# Patient Record
Sex: MALE
[De-identification: names, ages, dates, MRNs, and addresses within clinical notes are randomized per-mention and may not be internally consistent; named-entity substitution may affect disease eponyms.]

---

## 2018-06-07 ENCOUNTER — HOSPITAL ENCOUNTER (INPATIENT)
Dept: HOSPITAL 42 - ED | Age: 38
LOS: 3 days | Discharge: HOME HEALTH | DRG: 501 | End: 2018-06-10
Attending: INTERNAL MEDICINE | Admitting: INTERNAL MEDICINE
Payer: COMMERCIAL

## 2018-06-07 VITALS — BODY MASS INDEX: 42.7 KG/M2
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
## 2018-06-07 DIAGNOSIS — Z87.891: ICD-10-CM

## 2018-06-07 DIAGNOSIS — S76.111A: Primary | ICD-10-CM

## 2018-06-07 DIAGNOSIS — M19.90: ICD-10-CM

## 2018-06-07 DIAGNOSIS — M25.761: ICD-10-CM

## 2018-06-07 DIAGNOSIS — E66.9: ICD-10-CM

## 2018-06-07 DIAGNOSIS — I10: ICD-10-CM

## 2018-06-07 LAB
ALBUMIN SERPL-MCNC: 4.4 G/DL (ref 3–4.8)
ALBUMIN/GLOB SERPL: 1.4 {RATIO} (ref 1.1–1.8)
ALT SERPL-CCNC: 50 U/L (ref 7–56)
AST SERPL-CCNC: 36 U/L (ref 17–59)
BASOPHILS # BLD AUTO: 0.01 K/MM3 (ref 0–2)
BASOPHILS NFR BLD: 0.2 % (ref 0–3)
BUN SERPL-MCNC: 13 MG/DL (ref 7–21)
CALCIUM SERPL-MCNC: 8.8 MG/DL (ref 8.4–10.5)
EOSINOPHIL # BLD: 0.1 10*3/UL (ref 0–0.7)
EOSINOPHIL NFR BLD: 1.6 % (ref 1.5–5)
ERYTHROCYTE [DISTWIDTH] IN BLOOD BY AUTOMATED COUNT: 12.7 % (ref 11.5–14.5)
GFR NON-AFRICAN AMERICAN: > 60
GRANULOCYTES # BLD: 3.89 10*3/UL (ref 1.4–6.5)
GRANULOCYTES NFR BLD: 60.5 % (ref 50–68)
HGB BLD-MCNC: 14.3 G/DL (ref 14–18)
INR PPP: 1.14 (ref 0.93–1.08)
LYMPHOCYTES # BLD: 1.7 10*3/UL (ref 1.2–3.4)
LYMPHOCYTES NFR BLD AUTO: 26.7 % (ref 22–35)
MCH RBC QN AUTO: 31.4 PG (ref 25–35)
MCHC RBC AUTO-ENTMCNC: 34.6 G/DL (ref 31–37)
MCV RBC AUTO: 90.6 FL (ref 80–105)
MONOCYTES # BLD AUTO: 0.7 10*3/UL (ref 0.1–0.6)
MONOCYTES NFR BLD: 11 % (ref 1–6)
PLATELET # BLD: 192 10^3/UL (ref 120–450)
PMV BLD AUTO: 9.8 FL (ref 7–11)
PROTHROMBIN TIME: 13 SECONDS (ref 9.4–12.5)
RBC # BLD AUTO: 4.56 10^6/UL (ref 3.5–6.1)
WBC # BLD AUTO: 6.4 10^3/UL (ref 4.5–11)

## 2018-06-07 PROCEDURE — 0LQQ0ZZ REPAIR RIGHT KNEE TENDON, OPEN APPROACH: ICD-10-PCS | Performed by: ORTHOPAEDIC SURGERY

## 2018-06-07 NOTE — RAD
HISTORY:

PREOP  



COMPARISON:

No prior. 



FINDINGS:



LUNGS:

No active pulmonary disease.



PLEURA:

No significant pleural effusion identified, no pneumothorax apparent.



CARDIOVASCULAR:

Normal.



OSSEOUS STRUCTURES:

No significant abnormalities.



VISUALIZED UPPER ABDOMEN:

Normal.



OTHER FINDINGS:

None.



IMPRESSION:

No active disease.

## 2018-06-07 NOTE — HP
DATE OF EXAM:  06/07/2018



HISTORY OF PRESENT ILLNESS:  This 37-year-old male was examined in the

[facility] ER in the presence of his wife.  He presented with a

chief complaint of right knee discomfort.  He was on his way to work,

tripped on a curb, fell on his right knee but denied any head trauma or loss

of consciousness.  He complains of right knee pain that was moderately

severe in nature and the patient was unable to ambulate further.  He came

to the [facility] ER for further evaluation of the above.  In

the emergency room, he was evaluated with knee x-ray, knee CT, and knee MRI

and found to have a complete tear of the patellar tendon with a 17 mm

separation from his inferior border of the patella.  A large amount of

edema and fluid anterior to the patella was noted at the ruptured site.  No

knee fractures were noted and the patient is being evaluated by Orthopedics

for further evaluation of the above.  On further questioning of the

patient, he has a history of chronic hypertension and takes Zestril 2.5 mg

p.o. daily for this condition.  His primary care physician is Dr. David Bedolla.  On further questioning of the patient, he has had a right ankle

fracture in his past that was treated by Dr. Phuc Garza from

Orthopedics.



REVIEW OF SYSTEMS:

HEAD REVIEW:  No headache or seizure.

EYES REVIEW:  No change in visual acuity.

EARS REVIEW:  No hearing loss.

THROAT REVIEW:  No swallowing difficulty.

NECK REVIEW:  No stiffness.

CARDIAC REVIEW:  Chronic hypertension, on Zestril.  Denies any chest pain

or dyspnea on exertion.

PULMONARY:  No cough.  No hemoptysis.

GI:  No hematemesis or melena.

:  No dysuria.

SKIN:  No rash.

VASCULAR:  No claudication.

PSYCHOLOGIC:  No history of anxiety or depression.

NEUROLOGIC:  No seizure.  No stroke.



ALLERGIES:  HE DENIES ANY ALLERGIES TO MEDICATION.



SOCIAL HISTORY:  He states he is a former smoker.  No history of IV drug

misuse.



PAST MEDICAL HISTORY:  History of right ankle fracture in the distant past.



FAMILY HISTORY:  Noncontributory.



PHYSICAL EXAMINATION:

VITAL SIGNS:  Temperature 98.1, respirations 18, pulse 67, blood pressure

144/82 with a pulse ox of 98% on room air.

HEENT:  Head:  Normocephalic, atraumatic.  Eyes:  No icterus.  Throat: 

Clear.

NECK:  Supple.

HEART:  Regular, S1 and S2.  No pathological rubs, murmurs, or gallops.

LUNGS:  Clear to auscultation.

ABDOMEN:  Obese, nontender.  No palpable organomegaly.  No rebound, no

guarding, no tenderness.

EXTREMITIES:  No clubbing, no cyanosis, no edema.  He has an Ace bandage

wrapped around his right knee.  Legs warm to touch.

SKIN:  Without rash.

NEUROLOGIC:  Intact.

PSYCHOLOGIC:  Alert.

VASCULAR:  Legs warm to touch.



LABORATORY DATA:  Sodium 142, K 4.3, chloride 103, bicarb 27.  BUN 13,

creatinine 0.8.  Random blood sugar 101.  Bilirubin 0.7, AST 36, ALT 50,

alk phos 70.  White count 6400, hemoglobin 14.3, hematocrit 41.3, and

platelets 192,000.  Chest x-ray was reviewed.  It shows no evidence of

infiltrate, no pleural effusion, no pneumothorax, no infiltrate.  An EKG

reportedly shows a normal sinus rhythm with nonspecific ST-T wave changes.



IMPRESSION:  A 37-year-old gentleman status post a trip and fall with a

complete tear of his patellar tendon and separation of his tendon from the

inferior border of the patella.



PLAN:  The plan at present is to admit this patient.  He will be seen in

consultation by Dr. Phuc Garza from Orthopedics, who had seen the

patient in the past and whom the patient and family have requested for 
orthopedic evaluation. 

He remains n.p.o.  I will order Zestril 2.5 mg p.o. daily for hypertension

management and a stat PT/PTT.  Additional testing and interventions will be

entertained by Dr. Garza from Orthopedics and based on clinical

progress, additional testing and rehab will be ordered.  All of the above

was discussed in detail with the patient and wife, Poppy at the bedside.

Case was reviewed with emergency room physician as well.  All questions

were answered.



Greater than 75 minutes was spent in the care management, review of labs,

orders, x-rays, MRIs, and discussion of this patient's management with the

patient and ER physician.







__________________________________________

Luisa Forde MD



DD:  06/07/2018 16:16:55

DT:  06/07/2018 16:20:28

Job # [id number]

MTDD

## 2018-06-07 NOTE — ED PDOC
Arrival/HPI





- General


Chief Complaint: Trauma


Time Seen by Provider: 06/07/18 08:09


Historian: Patient





- History of Present Illness


Narrative History of Present Illness (Text): 





06/07/18 08:22





37 year old male, with no significant past medical history, presents to the 

Emergency department complaining of right knee discomfort, swelling and 

abrasion s/p trip and fall off of a curb prior to arrival. Patient denies 

hitting his head or any loss of consciousness. Patient denies any dizziness or 

lightheadedness prior to the fall. Patient rates the pain as a 6/10 and is 

constant. Patient denies any fever, chills, vomiting, diarrhea, abdominal pain, 

chest pain, shortness of breath, other trauma or any other complaints. Patient 

presents to the Emergency department for medical evaluation.  





Time/Duration: Prior to Arrival


Symptom Onset: Sudden


Symptom Course: Unchanged


Quality: Aching


Activities at Onset: Light


Context: Street





Past Medical History





- Provider Review


Nursing Documentation Reviewed: Yes





- Infectious Disease


Hx of Infectious Diseases: None





Family/Social History





- Physician Review


Nursing Documentation Reviewed: Yes


Family/Social History: No Known Family HX





Allergies/Home Meds


Allergies/Adverse Reactions: 


Allergies





No Known Allergies Allergy (Verified 06/07/18 08:13)


 











Review of Systems





- Physician Review


All systems were reviewed & negative as marked: Yes





- Review of Systems


Constitutional: Normal.  absent: Fevers


Eyes: Normal


ENT: Normal


Respiratory: Normal.  absent: SOB


Cardiovascular: Normal.  absent: Chest Pain


Gastrointestinal: Normal.  absent: Abdominal Pain, Diarrhea, Vomiting


Genitourinary Male: Normal


Musculoskeletal: Other (right knee pain s/p trip and fall)


Skin: Other (abrasion to right knee)


Neurological: Normal.  absent: Dizziness


Endocrine: Normal


Hemo/Lymphatic: Normal


Psychiatric: Normal





Physical Exam


Vital Signs Reviewed: Yes


Vital Signs











  Temp Pulse Resp BP Pulse Ox


 


 06/07/18 08:30     138/103 H 


 


 06/07/18 08:14  98.1 F  67  17   99











Temperature: Afebrile


Blood Pressure: Normal


Pulse: Regular


Respiratory Rate: Normal


Appearance: Positive for: Well-Appearing, Non-Toxic, Comfortable


Pain Distress: None


Mental Status: Positive for: Alert and Oriented X 3





- Systems Exam


Head: Present: Atraumatic, Normocephalic


Pupils: Present: PERRL


Extroacular Muscles: Present: EOMI


Conjunctiva: Present: Normal


Neck: Present: Normal Range of Motion


Respiratory/Chest: Present: Clear to Auscultation, Good Air Exchange.  No: 

Respiratory Distress, Accessory Muscle Use


Cardiovascular: Present: Regular Rate and Rhythm, Normal S1, S2.  No: Murmurs


Abdomen: No: Tenderness, Distention, Peritoneal Signs


Upper Extremity: Present: Normal Inspection.  No: Cyanosis, Edema


Lower Extremity: Present: NORMAL PULSES, Normal ROM, Tenderness (right knee), 

Swelling (right knee), Neurovascularly Intact, Other (high-riding knee)


Neurological: Present: GCS=15, CN II-XII Intact, Speech Normal


Skin: Present: Warm, Dry, Normal Color, Abrasion (right knee).  No: Rashes


Psychiatric: Present: Alert, Oriented x 3, Normal Insight, Normal Concentration





Medical Decision Making


ED Course and Treatment: 





06/07/18 08:21


Impression: 37 year old male presents to the Emergency department for right 

knee discomfort s/p trip and fall.





Plan:


-- CT of Knee 


-- Motrin


-- Percocet


-- Zofran


-- X-ray of right knee


-- Reassess and disposition





Progress Notes:


 


06/07/18 09:34


X-ray of right knee reviewed by radiologist, shows: 


FINDINGS:


BONES:


Normal. No fracture. 


JOINTS:


Small anterior osteophytes are seen arising from the patella. There is some 

soft tissue thickening anterior to the patellar tendon which may represent a 

prepatellar bursa. 


JOINT EFFUSION:


None. 


OTHER FINDINGS:


None.


IMPRESSION:


No evidence of fracture


--------------------------------------------------------------------------------

---------------------------------------------------------


06/07/18 10:27


CT of right knee reviewed by radiologist, shows: 


FINDINGS:


There is a small fluid collection anterior to the patellar tendon consistent 

with a prepatellar bursa. This measures 10 mm in thickness. There is a 

calcification within the patellar tendon.


There is no evidence of complete patellar tendon tear.  There may be a partial 

tear on the left side just inferior to the patella. This is seen on image 38 

series 3.


There is no evidence of fracture.


IMPRESSION:


Probable partial tear of the left side of the patellar tendon.


Small prepatellar bursa.


No evidence of fracture


--------------------------------------------------------------------------------

---------------------------------------------------------


06/07/18 10:29


Discussed case with Dr. Marie, who is aware and agrees with Emergency 

department management plan and requests to get a MRI performed for further 

evaluation.


--------------------------------------------------------------------------------

---------------------------------------------------------





06/07/18 12:38


MRI of right knee reviewed by radiologist, shows:


FINDINGS:


ANTERIOR CRUCIATE LIGAMENT::


Intact. 


POSTERIOR CRUCIATE LIGAMENT::


Intact. 


MEDIAL MENISCUS::


Intact. 


LATERAL MENISCUS::


Intact. 


MEDIAL COLLATERAL LIGAMENT::


Intact. 


LATERAL COLLATERAL LIGAMENT COMPLEX::


Intact. 


QUADRICEPS TENDON::


Intact. 


PATELLAR TENDON::


There is a complete tear of the patellar tendon with 17 mm of separation from 

the inferior border of the patella. There is a large amount of edema and fluid 

anterior to the patella and at the rupture site. . 


CARTILAGE::


Intact. 


JOINT FLUID::


Intact. 


OSSEOUS STRUCTURES::


Intact. 


OTHER FINDINGS:


None. 


IMPRESSION:


Ruptured patellar tendon


--------------------------------------------------------------------------------

---------------------------------------------------------





06/07/18 14:51


Upon reviewing the MRI results, Dr. Marie was paged. However, Dr. Quintana from 

Dr. Marie's office called back later and agreed that patient needs surgery and 

requested admission to medicine. After discussing management plan with patient, 

patient personally spoke to [facility] to determine his eligibility. As 

per patient, his insurance company denied coverage as the hospital is not under 

the network. Patient was informed that he has a medical emergency and immediate 

surgery is needed to be able to ambulate properly. Patient agreed with plan. 


 was contacted, who expressed that she is in charge of picking 

consultants. As per request, Dr. Marie was taken off the case and Dr. Garza was consulted. Case was discussed with Dr. Garza, who is 

aware and agrees with Emergency department management plan. Dr. Garza 

informs evaluating patient around 5-6 o clock today. 





06/07/18 15:13


Spoke to Dr. Marie, who was very upset about losing the case. Will contact Dr. Marley and Dr. Forde about situation. 





EKG reviewed, shows NSR at 68 bpm. Identical to previous EKG faxed over by [facility] 

[facility]. 





Chest X-ray reviewed by Dr. Maya, shows normal interpretation. 


06/07/18 15:18








- RAD Interpretation


Radiology Orders: 








06/07/18 08:14


KNEE WITHOUT CONTRAST RIGHT [CT] Stat 


KNEE W PATELLA RIGHT 3 VIEW [RAD] Stat 





06/07/18 10:28


KNEE W/O CONTRAST RIGHT [MRI] Stat 





06/07/18 14:29


CHEST PORTABLE [RAD] Stat 











: Radiologist





- Medication Orders


Current Medication Orders: 











Discontinued Medications





Ibuprofen (Motrin Tab)  800 mg PO STAT STA


   Stop: 06/07/18 08:15


   Last Admin: 06/07/18 08:22  Dose: 800 mg





MAR Pain/Vitals


 Document     06/07/18 08:22  [facility]  (Rec: 06/07/18 08:22  [facility]  [id number])


     Pain Reassessment


      Is This A Pain ReAssessment?               No


     Sleep


      Is patient sleeping during reassessment?   No


     Presence of Pain


      Presence of Pain                           Yes


     Pain Scale Used


      Pain Scale Used                            Numeric


     Location


      Left, Right or Bilateral                   Right


      Pain Location Body Site                    Knee


      Intensity                                  6


      Scale Used                                 Numeric





Ondansetron HCl (Zofran Odt)  8 mg PO STAT STA


   Stop: 06/07/18 08:15


   Last Admin: 06/07/18 08:22  Dose: 8 mg





Oxycodone/Acetaminophen (Percocet 5/325 Mg Tab)  2 tab PO STAT STA


   Stop: 06/07/18 08:15


   Last Admin: 06/07/18 08:22  Dose: 2 tab





MAR Pain Assessment


 Document     06/07/18 08:22  [facility]  (Rec: 06/07/18 08:23  [facility]  [id number])


     Pain Reassessment


      Is this a pain reassessment?               No


     Sleep


      Is patient sleeping during reassessment?   No


     Presence of Pain


      Presence of Pain                           No


     Pain Scale Used


      Pain Scale Used                            Numeric


     Location


      Left, Right or Bilateral                   Right


      Pain Location Body Site                    Knee


     Description


      Intensity of Pain at present               6














- Scribe Statement


The provider has reviewed the documentation as recorded by the Scribmeghna Fernandez.





All medical record entries made by the Ajitibe were at my direction and 

personally dictated by me. I have reviewed the chart and agree that the record 

accurately reflects my personal performance of the history, physical exam, 

medical decision making, and the department course for this patient. I have 

also personally directed, reviewed, and agree with the discharge instructions 

and disposition.





Disposition/Present on Arrival





- Present on Arrival


Any Indicators Present on Arrival: No


History of DVT/PE: No


History of Uncontrolled Diabetes: No


Urinary Catheter: No


History of Decub. Ulcer: No


History Surgical Site Infection Following: None





- Disposition


Have Diagnosis and Disposition been Completed?: Yes


Diagnosis: 


 Patellar tendon rupture





Disposition: HOME/ ROUTINE


Disposition Time: 10:16


Patient Plan: Discharge


Patient Problems: 


 Current Active Problems











Problem Status Onset


 


Patellar tendon rupture Acute  











Condition: GOOD


Discharge Instructions (ExitCare):  Tendon Laceration (DC)


Additional Instructions: 


[place] that you had this happen today.  You tore your patellar tendon.  You most 

probably will have to have surgery.  Return to us if problems.  Wear the ace 

wrap and the immobilizer at all times except to bathe.  Do not weight bear, Use 

the crutches.





Percocet is for really bad pain.  ZofranODT is for the upset stomach the 

Percocet will cause.  Motrin is for not so bad pain and inflammation.





Cory-


Dr. Taras Maya





Prescriptions: 


Ibuprofen [Motrin Tab] 800 mg PO TID #30 tab


Ondansetron ODT [Zofran ODT] 8 mg PO TID #30 odt


oxyCODONE/Acetaminophen [Percocet 5/325 mg Tab] 1 ea PO QID #20 tab


Forms:  [facility] (English), SCHOOL NOTE, WORK NOTE

## 2018-06-07 NOTE — CT
PROCEDURE:  



CT of the right knee without contrast



HISTORY:

Patella Tendon Injury?



COMPARISON:





TECHNIQUE:





Radiation dose:



Total exam DLP = 326 mGy-cm.



This CT exam was performed using one or more of the following dose 

reduction techniques: Automated exposure control, adjustment of the 

mA and/or kV according to patient size, and/or use of iterative 

reconstruction technique.



FINDINGS:

There is a small fluid collection anterior to the patellar tendon 

consistent with a prepatellar bursa. This measures 10 mm in 

thickness. There is a calcification within the patellar tendon.



There is no evidence of complete patellar tendon tear.  There may be 

a partial tear on the left side just inferior to the patella. This is 

seen on image 38 series 3.



There is no evidence of fracture.



IMPRESSION:

Probable partial tear of the left side of the patellar tendon.



Small prepatellar bursa.



No evidence of fracture

## 2018-06-07 NOTE — MRI
PROCEDURE:  MRI Right Knee



HISTORY:

Pain. 



COMPARISON:

Earlier CT and plain film 



TECHNIQUE:

Multiecho multiplanar sequences were performed through the right knee.



FINDINGS:



ANTERIOR CRUCIATE LIGAMENT::

Intact. 



POSTERIOR CRUCIATE LIGAMENT::

Intact. 



MEDIAL MENISCUS::

Intact. 



LATERAL MENISCUS::

Intact. 



MEDIAL COLLATERAL LIGAMENT::

Intact. 



LATERAL COLLATERAL LIGAMENT COMPLEX::

Intact. 



QUADRICEPS TENDON::

Intact. 



PATELLAR TENDON::

There is a complete tear of the patellar tendon with 17 mm of 

separation from the inferior border of the patella. There is a large 

amount of edema and fluid anterior to the patella and at the rupture 

site. . 



CARTILAGE::

Intact. 



JOINT FLUID::

Intact. 



OSSEOUS STRUCTURES::

Intact. 



OTHER FINDINGS:

None. 



IMPRESSION:

Ruptured patellar tendon

## 2018-06-08 LAB
ALBUMIN SERPL-MCNC: 4 G/DL (ref 3–4.8)
ALBUMIN/GLOB SERPL: 1.4 {RATIO} (ref 1.1–1.8)
ALT SERPL-CCNC: 35 U/L (ref 7–56)
AST SERPL-CCNC: 32 U/L (ref 17–59)
BUN SERPL-MCNC: 10 MG/DL (ref 7–21)
CALCIUM SERPL-MCNC: 8.2 MG/DL (ref 8.4–10.5)
ERYTHROCYTE [DISTWIDTH] IN BLOOD BY AUTOMATED COUNT: 13 % (ref 11.5–14.5)
GFR NON-AFRICAN AMERICAN: > 60
HGB BLD-MCNC: 13.1 G/DL (ref 14–18)
MCH RBC QN AUTO: 31.4 PG (ref 25–35)
MCHC RBC AUTO-ENTMCNC: 34.3 G/DL (ref 31–37)
MCV RBC AUTO: 91.6 FL (ref 80–105)
PLATELET # BLD: 180 10^3/UL (ref 120–450)
PMV BLD AUTO: 9.8 FL (ref 7–11)
RBC # BLD AUTO: 4.17 10^6/UL (ref 3.5–6.1)
WBC # BLD AUTO: 8.1 10^3/UL (ref 4.5–11)

## 2018-06-08 RX ADMIN — MORPHINE SULFATE PRN MG: 4 INJECTION, SOLUTION INTRAMUSCULAR; INTRAVENOUS at 03:48

## 2018-06-08 RX ADMIN — ENOXAPARIN SODIUM SCH MG: 40 INJECTION SUBCUTANEOUS at 10:25

## 2018-06-08 RX ADMIN — MORPHINE SULFATE PRN MG: 4 INJECTION, SOLUTION INTRAMUSCULAR; INTRAVENOUS at 10:26

## 2018-06-08 NOTE — RAD
PROCEDURE:  Bilateral Knee Radiographs.



HISTORY:

patellatendon rupture



COMPARISON:

None.



FINDINGS:



BONES:

Right Knee: Postop changes right patella 



Left Knee:  Normal. No fracture. 



JOINTS:

Right Knee:  Normal. No osteoarthritis. 



Left knee: Normal. No osteoarthritis. 



SOFT TISSUES:

Right Knee: Normal.



Left Knee: Normal.



JOINT EFFUSION:

Right Knee: None. 



Left Knee: None.



OTHER FINDINGS:

None.



IMPRESSION:

Postoperative changes right knee.

## 2018-06-08 NOTE — CARD
--------------- APPROVED REPORT --------------





EKG Measurement

Heart [id number]

SC [id number]

[id number]

[id number]

[id number]



<Conclusion>

Normal sinus rhythm

Normal ECG

## 2018-06-08 NOTE — OP
PROCEDURE DATE:  06/07/2018



PREOPERATIVE DIAGNOSES:  Acute rupture of patellar tendon of the distal

pole of the patella with osteophytes and tendinosis.



POSTOPERATIVE DIAGNOSES:  Acute rupture of patellar tendon of the distal

pole of the patella with osteophytes and tendinosis.



PROCEDURE:  Primary open repair of the patellar tendon, right knee, with

[employer] ToggleLoc with ZipLoop fixation device x2 utilizing #5 FiberWire

sutures.



SURGEON:  Phuc Garza DO.



TYPE OF ANESTHESIA:  General endotracheal tube.



DESCRIPTION OF PROCEDURE:  The patient was taken to OR.  Right lower

extremity was prepped and draped in sterile fashion, given 2 g of Ancef. 

No tourniquet was utilized.  We prepped and draped the right lower

extremity from the groin to the ankle.  Then, we did a skin incision just

medial to the mid portion of the patella because he did have a tibial

tubercle abrasion from the accident, but it just happened within 8 hours or

so.  It was consistently cleaned as best as possible with saline irrigation

irritation.  So, once the 8-inch incision was made over the right knee

centering at the patella, going 4 inches above and 4 inches below the

patellar mid portion.  A new knife was used to go through the abundant

adipose tissue and the tendon was readily explored, knowing that it was

coming off the patellar tendon distal pole with some shredding of the

tendon itself.  After the peritenon was opened in such a fashion that we

planned to close it postop, we debrided the tissue from the distal pole of

the patella, made a rongeur debridement of the insertion site by removing

the previous osteophytes that were developing from the tendinosis.  There

is also found to have a large lateral retinaculum tear, which eventually

was repaired.  Once the tendon was isolated at the patella, from distal to

proximal, taking off the rough edges, and after we made a raw surface to

the distal pole of the patella, we put the loop through the patella using

medial and lateral strands and a combined one in the center.  We had 4

strands of sutures, Krackow sutures on the periphery of the tendon and a

baseball stitch in the center of the tendon, being careful not to irritate

the sutures with the sharp needle.  Now we had two alegria of sutures in the

patella, one medial and one lateral from the ZipLoop technology system from

[employer].  Once the tendon was secured distally and the patella was cleaned

up off its surface attachments, we put in drill holes, one medial and one

laterally, and over-reamed it with a 4 mm reamer to allow us to pass the

anchoring pin for the ZipLoop device to go into that over-reamed tunnel and

anchoring in the [id number] portion of the patella, and then that was

tightened and secured, and we got a good coaptation of the ruptured

patellar tendon down to the raw bleeding bone, and to help give it some

restraint postop, we put a cerclage loop of #5 Ethibond from the distal

pole of the patella just distal to the last sutures that we used on the

patellar tendon and the tibial tubercle had the horizontal hole.  So, we

passed a #5 FiberWire through the tibial tubercle, reaching horizontally

and then brought them up anterior to the tunnel used for ZipLoop pins and

suture, so that when he flexes his knee that sutures are not bothering his

tibia.  The sutures are in line with the patellar tendon _____ going to be

cerclage wire.  Once this was done, we flexed the knee readily to 90

without the repair , so we did a deep repair of the retinaculum

laterally and the peritenon anteriorly, subcutaneous and fascia with 2-0

Vicryl and 0 Vicryl, and the skin with a combination of 2-0 nylon and

stainless steel staples.  The patient was placed in a well positioned and

molded cylinder cast leaving the ankle free, so he could put weight on it

postop and secured the distal and the proximal ends of the cylinder cast

with a cervical coller used to pad the upper and lower part of the cast  .The 
patient was taken to recovery room in good

condition.  We gave the patient antibiotics preop and put vancomycin powder

in the wound.





__________________________________________

Phuc Garza DO



DD:  06/07/2018 20:02:04

DT:  06/08/2018 0:16:48

Job # [id number]

MACARIO

## 2018-06-09 VITALS — RESPIRATION RATE: 20 BRPM

## 2018-06-09 RX ADMIN — ENOXAPARIN SODIUM SCH MG: 40 INJECTION SUBCUTANEOUS at 09:16

## 2018-06-09 NOTE — PN
DATE:  06/08/2018



SUBJECTIVE:  This 37-year-old male was examined at his bedside in the

presence of his wife, Poppy.  The case was also reviewed in detail with

nursing and Dr. Phuc Garza from Orthopedics.  The patient is

status post a successful repair of the complete right knee patellar

rupture.  He was taken successfully to the OR last evening where this was

repaired, and the patient was put in a full leg cast and ordered to be out

of bed to chair today with the initiation of physical therapy.  At present,

he is receiving Lovenox 40 mg subcu daily, morphine 4 mg IV every 4 hours

p.r.n. severe pain which he just received recently, and his Zestril is

continuing at 5 mg p.o. daily because of pain-induced hypertension.  The

patient at present denies any fever, chills, chest pain or shortness of

breath, and he is cooperating with the nursing staff.



PHYSICAL EXAMINATION:

VITAL SIGNS:  Temperature 99.1, respirations 18, pulse 74 and blood

pressure 144/79, with a pulse ox of 97%.

HEENT:  Head normocephalic, atraumatic.  Eyes:  No icterus.  Ears:  Clear. 

Throat:  Noninjected.

NECK:  Supple.

HEART:  Regular S1 and S2.

LUNGS:  Clear.

ABDOMEN:  Soft.

EXTREMITIES:  No edema.

SKIN:  No rash.

VASCULAR:  No claudication.

PSYCHOLOGICAL:  Alert and oriented x3.

NEUROLOGICAL:  Grossly intact.



LABORATORY DATA:  White count 8100, hemoglobin 13.1, hematocrit 38.2,

platelets 180,000.  Sodium 142, potassium 4.2, chloride 105, bicarb 25, BUN

10, creatinine 0.7, random blood sugar 131.  Bilirubin 0.7, AST 32, ALT 35,

alk phos 65.



IMPRESSION:  This is a 37-year-old male status post a trip and fall where

he sustained a complete rupture of his right patellar tendon that was

surgically repaired last evening and now requires the patient to be in a

full-leg cast for a minimum of 2 to 4 more weeks as per Dr. Phuc Garza from Orthopedics.  The patient will also require subcu Lovenox

which I have asked the nursing staff to train both the patient and wife how

to administer, and he will require physical therapy prior to plans for

discharge.  The patient was instructed on incentive spirometry, heart

healthy soft bland diet, and will continue on Zestril 5 mg p.o. daily,

morphine 4 mg IV every 4 hours p.r.n. severe pain and Lovenox 40 mg subcu

daily.  The patient will need to be monitored by Dr. Phuc Garza to

determine safety for discharge.



Greater than 35 minutes was spent in the care management, review of labs,

orders, x-rays, and discussion of this patient's management with himself,

his wife, nursing and Dr. Phuc Garza from Orthopedics.  All

questions were answered.





__________________________________________

Luisa Forde MD



DD:  06/08/2018 22:11:16

DT:  06/08/2018 22:12:31

Job # [id number]



MTDD

## 2018-06-09 NOTE — PN
DATE:  06/09/2018



POSTOPERATIVE REPORT



SUBJECTIVE:  A 37-year-old male in room 568, bed 2.  He is two days postop

from acute ruptured patellar tendon of the right knee and is going to be

planned to go to subacute rehab, so far it was to be [facility] in

[place] with less pain.  He is in a well-padded cylinder cast and no

fever.  We will plan on seeing him in approximately 2 weeks and we will window 
the

cast to look at the wound.  He will be going to rehab with a right-sided

cylinder cast.  We will follow up with [facility] that is where he goes.







__________________________________________

Phuc Garza DO



DD:  06/09/2018 9:00:59

DT:  06/09/2018 13:13:00

Job # [id number]

MTDROD

## 2018-06-09 NOTE — PN
oob

DATE:  06/08/2018



POSTOPERATIVE REPORT



A 37-year-old male in room 578, bed 2, one day postop from right patellar

tendon repair from acute rupture.  He is presently oob  but he is still

in the cast.  Complains of pain, but not undue.  He is up out of bed.  DVT

prophylaxis with Lovenox and ambulate with a walker, weightbearing to

tolerance and of course, no bending in that knee.  He already is still in

the cast.  Also, he cannot bend or flex it, but he is allowed to walk and

need assistance, when bed to chair somebody has to support that right leg,

so it is not stressing the repair of the ruptured patellar tendon.  He has

no fever.  Once he is cleared with the walker, we will send him home with

commode and therapy.





__________________________________________

Phuc Garza DO



DD:  06/08/2018 12:53:43

DT:  06/08/2018 14:56:43

Job # [id number]

MACARIO

## 2018-06-10 VITALS
OXYGEN SATURATION: 99 % | SYSTOLIC BLOOD PRESSURE: 147 MMHG | HEART RATE: 78 BPM | DIASTOLIC BLOOD PRESSURE: 84 MMHG | TEMPERATURE: 98.5 F

## 2018-06-10 RX ADMIN — MORPHINE SULFATE PRN MG: 4 INJECTION, SOLUTION INTRAMUSCULAR; INTRAVENOUS at 12:28

## 2018-06-10 RX ADMIN — ENOXAPARIN SODIUM SCH MG: 40 INJECTION SUBCUTANEOUS at 12:33

## 2018-06-10 NOTE — DS
LOCATION:  In room 578, bed 2.



HOSPITAL COURSE:  The patient underwent open repair of a patellar tendon

rupture of his right knee on the day of admission, which was 06/07/2018. 

The patient has no fever.  The pain is improving.  He had very minimal

therapy.  He should have more therapy, but for some reason, it was not

done, but he cannot put weight on the right leg where he has a well-padded

cylinder cast and he needs to walk on the crutches in a wheelchair and home

therapy.  We are going to put him on one adult aspirin once a day for DVT

prophylaxis, which has been shown to work as good as the Lovenox and that

he did not get out of bed all day yesterday and the day before, he is at

high risk of bedsores and he is overweight so we will get him out of the

hospital so we will get him home and try to mobilize him more than he is

getting in the hospital and he can put weight on that leg with a walker and

I will see him in the office in 10 days.



FINAL DIAGNOSIS:  Patellar tendon rupture with acute repair on the day of

injury.







__________________________________________

Phuc Graza DO



DD:  06/10/2018 9:31:55

DT:  06/10/2018 10:17:20

Job # [id number]

## 2018-06-10 NOTE — DS
DATE OF EXAM:  06/10/2018



FINAL DIAGNOSES:  Complete rupture of right patellar tendon, repaired;

chronic hypertension; obesity; degenerative arthritis.



DISPOSITION:  Home with family providing 24 hours supervision and support.



Follow up with Dr. Phuc Garza, Orthopedics.  Follow up with Dr. Bedolla, PMD within 48 hours.  The patient will have visiting nurse

association and home physical therapy.



DISCHARGE DIET:  Heart-healthy.



DISCHARGE MEDICATIONS:  Zestril 5 mg p.o. daily, Ultracet one p.o. every 8

hours p.r.n. severe pain, #15 no refills; Ecotrin 325 mg p.o. daily; Colace

100 mg p.o. t.i.d. p.r.n. obstipation.



This 37-year-old male was admitted to [facility] after a trip

and fall where he sustained a complete rupture of his right patellar tendon

that required surgical repair.  This was performed on the evening of

admission and the patient required postoperative hospitalization because of

severe pain, immobility and need for physical therapy for safety.  At the

time of this dictation, he is cleared for discharge by Orthopedics.  He has

been instructed on safety for transfer, ambulation with walker and on and

off the chair and off a bed and toilet.



PHYSICAL EXAMINATION:  His vital signs are temperature 98.5, respirations

20, pulse 78, blood pressure 147/84 with a pulse ox of 99% on room air.



DATA:  Discharge labs show white count 8100, hemoglobin 13.1, hematocrit

38.2, platelets 180,000.  Sodium 142, K 4.2, chloride 105, bicarb 25, BUN

10, creatinine 0.7, random blood sugar 131.  All liver function testing

were normal including bilirubin 0.7, AST 32, ALT 35 and alkaline

phosphatase 65.



The patient has been cleared for discharge to home.  He and his wife had

been instructed on discharge orders and safety.  He is instructed to do

incentive spirometry every 1 hour and he was instructed regarding fall

precautions.  All of the above was discussed in detail with the patient,

his wife at the bedside, Dr. Phuc Garza from Orthopedics and his

nurse, Leonie, registered nurse.  Greater than 35 minutes was spent in the

care management, review of labs, orders and x-rays.  All questions were

answered.



The patient was advised to follow up with PMD for additional monitoring of

blood pressure, renewal of medications and to follow up with Dr. Phuc Garza as per his orders as well.







__________________________________________

Luisa Forde MD



DD:  06/10/2018 14:18:04

DT:  06/10/2018 14:19:47

Job # [id number]



MTDD